# Patient Record
Sex: FEMALE | Race: WHITE | NOT HISPANIC OR LATINO | Employment: FULL TIME | URBAN - METROPOLITAN AREA
[De-identification: names, ages, dates, MRNs, and addresses within clinical notes are randomized per-mention and may not be internally consistent; named-entity substitution may affect disease eponyms.]

---

## 2018-01-03 ENCOUNTER — OFFICE VISIT (OUTPATIENT)
Dept: ORTHOPEDIC SURGERY | Facility: CLINIC | Age: 43
End: 2018-01-03

## 2018-01-03 VITALS
HEIGHT: 62 IN | DIASTOLIC BLOOD PRESSURE: 90 MMHG | SYSTOLIC BLOOD PRESSURE: 150 MMHG | WEIGHT: 182.98 LBS | BODY MASS INDEX: 33.67 KG/M2

## 2018-01-03 DIAGNOSIS — S83.005A PATELLAR DISLOCATION, LEFT, INITIAL ENCOUNTER: ICD-10-CM

## 2018-01-03 DIAGNOSIS — R52 PAIN: Primary | ICD-10-CM

## 2018-01-03 PROCEDURE — 99204 OFFICE O/P NEW MOD 45 MIN: CPT | Performed by: ORTHOPAEDIC SURGERY

## 2018-01-03 NOTE — PROGRESS NOTES
[facility] [facility] Note    Subjective     Chief Complaint   Patient presents with   • Left Knee - Pain        HPI      Farida Flowers is a 42 y.o. female.  She injured her knee at work when she had her foot caught in a metal rack and twisted her knee at [employer].  This happened December 15, 2017.  She takes Tylenol 3.  Pain is 3 out of 10 and aching.  She had an MRI at [facility].        History reviewed. No pertinent past medical history.   Past Surgical History:   Procedure Laterality Date   • RHINOPLASTY      septo plastisis      Family History   Problem Relation Age of Onset   • Cancer Mother    • Osteoarthritis Father      Social History     Social History   • Marital status:      Spouse name: N/A   • Number of children: N/A   • Years of education: N/A     Occupational History   • Not on file.     Social History Main Topics   • Smoking status: Former Smoker     Packs/day: 1.50     Years: 5.00     Types: Cigarettes     Start date: 1992     Quit date: 1997   • Smokeless tobacco: Former User   • Alcohol use Yes      Comment: occasional   • Drug use: No   • Sexual activity: Defer     Other Topics Concern   • Not on file     Social History Narrative   • No narrative on file      No current outpatient prescriptions on file prior to visit.     No current facility-administered medications on file prior to visit.       No Known Allergies     The following portions of the patient's history were reviewed and updated as appropriate: allergies, current medications, past family history, past medical history, past social history, past surgical history and problem list.    Review of Systems   Constitutional: Negative.    HENT: Negative.    Eyes: Negative.    Respiratory: Negative.    Cardiovascular: Negative.    Gastrointestinal: Negative.    Endocrine: Negative.    Genitourinary: Negative.    Musculoskeletal: Positive for arthralgias.   Skin: Negative.    Allergic/Immunologic:  "Negative.    Neurological: Negative.    Hematological: Negative.    Psychiatric/Behavioral: Negative.         Objective      Physical Exam  /90  Ht 157.5 cm (62\")  Wt 83 kg (182 lb 15.7 oz)  BMI 33.47 kg/m2    Body mass index is 33.47 kg/(m^2).        GENERAL APPEARANCE: awake, alert & oriented x 3, in no acute distress and well developed, well nourished  PSYCH: normal mood andaffect  LUNGS:  breathing nonlabored, no wheezing  EYES: sclera anicteric, pupils equal  CARDIOVASCULAR: palpable pulses dorsalis pedis, palpable posterior tibial bilaterally. Capillary refill less than 2 seconds  INTEGUMENTARY: skin intact, no clubbing, cyanosis  NEUROLOGIC:  Antalgic  gait on the left and normal balance            Ortho Exam  Peripheral Vascular:    Upper Extremity:   Inspection:  Left--no cyanotic nail beds Right--no cyanotic nail beds   Bilateral:  Pink nail beds with brisk capillary refill   Palpation:  Bilateral radial pulse normal    Musculoskeletal:  Global Assessment:  Overall assessment of Lower Extremity Muscle Strength and Tone:  Left quadriceps--5/5   Left hamstrings--5/5       Left tibialis anterior--5/5  Left gastroc-soleus--5/5  Left EHL--5/5      Lower Extremity:  Knee/Patella:  No digital clubbing or cyanosis.    Examination of left knee reveals:  Normal deep tendon reflexes, coordination, strength, tone, sensation.  No known fractures or deformities.    Inspection and Palpation:    Left knee:  Tenderness:  Medial patella  Effusion: 1+  Crepitus:  none  Pulses:  2+  Ecchymosis:  None  Warmth:  None       ROM:  Right:  Extension:0    Flexion:135  Left:  Extension:0     Flexion:135    Instability:    Left:  Lachman Test:  Negative, Varus stress test negative, Valgus stress test negative   Anterior Drawer Test:  Negative, Posterior Drawer Test:  Negative  Laxity of medial patellofemoral ligament      Deformities/Malalignments/Discrepancies:    Left:  none  Right:  none    Functional " Testing:    Left:  Cameron's test:  Negative  Patella grind test:  Negative  Q-angle:  Normal  Apprehension Sign:  Positive patellar apprehension    Imaging/Studies  Imaging Results (last 24 hours)     Procedure Component Value Units Date/Time    XR Knee 4+ View Left [[id number]] Resulted:  01/03/18 1025     Updated:  01/03/18 1027    Narrative:       Knee X-Ray  Indication: Pain    Upright AP of bilateral knees. Lateral, skiers and Sunrise views of left   knee     Findings:  No fracture  No bony lesion  Normal soft tissues  Normal joint spaces    No prior studies were available for comparison.            I reviewed the MRI left knee which is partial tear of the ACL lateral patella subluxation and joint effusion  Assessment/Plan      Farida was seen today for pain.    Diagnoses and all orders for this visit:    Pain  -     XR Knee 4+ View Left    Patellar dislocation, left, initial encounter  -     Ambulatory Referral to Physical Therapy      I recommend physical therapy.  She was given a patella stabilizing sleeve today.  She will follow-up in 3 weeks.  She is work restrictions of seated job only or off work.    Medical Decision Making  Management Options : over-the-counter medicine, physical/occupational therapy and close treatment of fracture or dislocation  Data/Risk: radiology tests and independent visualization of imaging, lab tests, or EMG/NCV    Ramon Diez MD  01/03/18  10:36 AM

## 2018-01-24 ENCOUNTER — OFFICE VISIT (OUTPATIENT)
Dept: ORTHOPEDIC SURGERY | Facility: CLINIC | Age: 43
End: 2018-01-24

## 2018-01-24 VITALS
HEART RATE: 68 BPM | WEIGHT: 178.57 LBS | DIASTOLIC BLOOD PRESSURE: 82 MMHG | SYSTOLIC BLOOD PRESSURE: 105 MMHG | HEIGHT: 62 IN | BODY MASS INDEX: 32.86 KG/M2

## 2018-01-24 DIAGNOSIS — S83.005D PATELLAR DISLOCATION, LEFT, SUBSEQUENT ENCOUNTER: Primary | ICD-10-CM

## 2018-01-24 PROCEDURE — 99213 OFFICE O/P EST LOW 20 MIN: CPT | Performed by: ORTHOPAEDIC SURGERY

## 2018-01-24 RX ORDER — BENZONATATE 100 MG/1
100 CAPSULE ORAL DAILY
COMMUNITY

## 2018-01-24 NOTE — PROGRESS NOTES
[facility]    Subjective     Chief Complaint   Patient presents with   • Follow-up     3 weeks - Left knee patellar dislocation        HPI      Farida Flowers is a 42 y.o. female.  She believe she is getting better.  She is not able to go back to work.  They do not have light duty for her.  She goes to physical therapy at [facility]        History reviewed. No pertinent past medical history.   Past Surgical History:   Procedure Laterality Date   • RHINOPLASTY      septo plastisis      Family History   Problem Relation Age of Onset   • Cancer Mother    • Osteoarthritis Father      Social History     Social History   • Marital status:      Spouse name: N/A   • Number of children: N/A   • Years of education: N/A     Occupational History   • Not on file.     Social History Main Topics   • Smoking status: Former Smoker     Packs/day: 1.50     Years: 5.00     Types: Cigarettes     Start date: 1992     Quit date: 1997   • Smokeless tobacco: Former User   • Alcohol use Yes      Comment: occasional   • Drug use: No   • Sexual activity: Defer     Other Topics Concern   • Not on file     Social History Narrative      Current Outpatient Prescriptions on File Prior to Visit   Medication Sig Dispense Refill   • Acetaminophen-Codeine (TYLENOL WITH CODEINE #3 PO) Take  by mouth Every Night.       No current facility-administered medications on file prior to visit.       No Known Allergies     The following portions of the patient's history were reviewed and updated as appropriate: allergies, current medications, past family history, past medical history, past social history, past surgical history and problem list.    Review of Systems   Constitutional: Negative.    HENT: Negative.    Eyes: Negative.    Respiratory: Negative.    Cardiovascular: Negative.    Gastrointestinal: Negative.    Endocrine: Negative.    Genitourinary: Negative.    Musculoskeletal: Positive for arthralgias, gait  "problem and joint swelling.   Skin: Negative.    Allergic/Immunologic: Negative.    Hematological: Negative.    Psychiatric/Behavioral: Negative.         Objective      Physical Exam  /82  Pulse 68  Ht 157.5 cm (62.01\")  Wt 81 kg (178 lb 9.2 oz)  BMI 32.65 kg/m2    Body mass index is 32.65 kg/(m^2).        GENERAL APPEARANCE: awake, alert & oriented x 3, in no acute distress and well developed, well nourished  PSYCH: normal mood andaffect            Ortho Exam  Peripheral Vascular:    Upper Extremity:   Inspection:  Left--no cyanotic nail beds Right--no cyanotic nail beds   Bilateral:  Pink nail beds with brisk capillary refill   Palpation:  Bilateral radial pulse normal    Musculoskeletal:  Global Assessment:  Overall assessment of Lower Extremity Muscle Strength and Tone:  Left quadriceps--5/5   Left hamstrings--5/5       Left tibialis anterior--5/5  Left gastroc-soleus--5/5  Left EHL--5/5      Lower Extremity:  Knee/Patella:  No digital clubbing or cyanosis.    Examination of left knee reveals:  Normal deep tendon reflexes, coordination, strength, tone, sensation.  No known fractures or deformities.    Inspection and Palpation:    Left knee:  Tenderness:  none  Effusion:  none  Crepitus:  none  Pulses:  2+  Ecchymosis:  None  Warmth:  None       ROM:  Right:  Extension:0    Flexion:135  Left:  Extension:0     Flexion:135    Instability:    Left:  Lachman Test:  Negative, Varus stress test negative, Valgus stress test negative   Anterior Drawer Test:  Negative, Posterior Drawer Test:  Negative      Deformities/Malalignments/Discrepancies:    Left:  none  Right:  none    Functional Testing:    Left:  Cameron's test:  Negative  Patella grind test:  Negative  Q-angle:  Normal  Apprehension Sign:  Positive    I reviewed the previous outside MRI with a partial ACL tear and lateral patella subluxation.  Assessment/Plan      Farida was seen today for follow-up.    Diagnoses and all orders for this " visit:    Patellar dislocation, left, subsequent encounter  -     Ambulatory Referral to Physical Therapy    She requires continued physical therapy.  She is still on work restrictions of a seated job only or off work.  She will follow up in 3 weeks.  She is getting better    Medical Decision Making  Management Options : over-the-counter medicine and physical/occupational therapy  The previous images were reviewed again    Ramon Diez MD  01/24/18  9:01 AM

## 2018-02-16 ENCOUNTER — OFFICE VISIT (OUTPATIENT)
Dept: ORTHOPEDIC SURGERY | Facility: CLINIC | Age: 43
End: 2018-02-16

## 2018-02-16 VITALS
WEIGHT: 178.57 LBS | HEART RATE: 67 BPM | BODY MASS INDEX: 32.86 KG/M2 | SYSTOLIC BLOOD PRESSURE: 123 MMHG | HEIGHT: 62 IN | DIASTOLIC BLOOD PRESSURE: 86 MMHG

## 2018-02-16 DIAGNOSIS — S83.005D PATELLAR DISLOCATION, LEFT, SUBSEQUENT ENCOUNTER: Primary | ICD-10-CM

## 2018-02-16 PROCEDURE — 99212 OFFICE O/P EST SF 10 MIN: CPT | Performed by: ORTHOPAEDIC SURGERY

## 2018-02-16 NOTE — PROGRESS NOTES
[facility]    Subjective     Chief Complaint   Patient presents with   • Left Knee - Follow-up     3 weeks - Left knee patellar dislocation        HPI      Farida Flowers is a 42 y.o. female.  She is follow-up left knee injury.    At work December 15, 2017.  Her pain is 0.  She's been brace anti-inflammatories.  Her physical therapist has released her.        History reviewed. No pertinent past medical history.   Past Surgical History:   Procedure Laterality Date   • RHINOPLASTY      septo plastisis      Family History   Problem Relation Age of Onset   • Cancer Mother    • Osteoarthritis Father      Social History     Social History   • Marital status:      Spouse name: N/A   • Number of children: N/A   • Years of education: N/A     Occupational History   • Not on file.     Social History Main Topics   • Smoking status: Former Smoker     Packs/day: 1.50     Years: 5.00     Types: Cigarettes     Start date: 1992     Quit date: 1997   • Smokeless tobacco: Former User   • Alcohol use Yes      Comment: occasional   • Drug use: No   • Sexual activity: Defer     Other Topics Concern   • Not on file     Social History Narrative      Current Outpatient Prescriptions on File Prior to Visit   Medication Sig Dispense Refill   • Acetaminophen-Codeine (TYLENOL WITH CODEINE #3 PO) Take  by mouth Every Night.     • benzonatate (TESSALON) 100 MG capsule Take 100 mg by mouth Daily.       No current facility-administered medications on file prior to visit.       No Known Allergies     The following portions of the patient's history were reviewed and updated as appropriate: allergies, current medications, past family history, past medical history, past social history, past surgical history and problem list.    Review of Systems   Constitutional: Negative.    HENT: Negative.    Eyes: Negative.    Respiratory: Negative.    Cardiovascular: Negative.    Gastrointestinal: Negative.    Endocrine:  "Negative.    Genitourinary: Negative.    Musculoskeletal: Positive for arthralgias and gait problem.   Skin: Negative.    Allergic/Immunologic: Negative.    Hematological: Negative.    Psychiatric/Behavioral: Negative.         Objective      Physical Exam  /86  Pulse 67  Ht 157.5 cm (62.01\")  Wt 81 kg (178 lb 9.2 oz)  BMI 32.65 kg/m2    Body mass index is 32.65 kg/(m^2).        GENERAL APPEARANCE: awake, alert & oriented x 3, in no acute distress and well developed, well nourished  PSYCH: normal mood and affect           Ortho Exam  Peripheral Vascular:    Upper Extremity:   Inspection:  Left--no cyanotic nail beds Right--no cyanotic nail beds   Bilateral:  Pink nail beds with brisk capillary refill   Palpation:  Bilateral radial pulse normal    Musculoskeletal:  Global Assessment:  Overall assessment of Lower Extremity Muscle Strength and Tone:  Left quadriceps--5/5   Left hamstrings--5/5       Left tibialis anterior--5/5  Left gastroc-soleus--5/5  Left EHL--5/5      Lower Extremity:  Knee/Patella:  No digital clubbing or cyanosis.    Examination of left knee reveals:  Normal deep tendon reflexes, coordination, strength, tone, sensation.  No known fractures or deformities.    Inspection and Palpation:    Left knee:  Tenderness:  none  Effusion:  none  Crepitus:  none  Pulses:  2+  Ecchymosis:  None  Warmth:  None       ROM:  Right:  Extension:0    Flexion:135  Left:  Extension:0     Flexion:135    Instability:    Left:  Lachman Test:  Negative, Varus stress test negative, Valgus stress test negative   Anterior Drawer Test:  Negative, Posterior Drawer Test:  Negative      Deformities/Malalignments/Discrepancies:    Left:  none  Right:  none    Functional Testing:    Left:  Cameron's test:  Negative  Patella grind test:  Negative  Q-angle:  Normal  Apprehension Sign:  Negative    Imaging/Studies  Imaging Results (last 24 hours)     ** No results found for the last 24 hours. **        Previous MRI with " partial ACL tear and patella subluxation  Assessment/Plan      Farida was seen today for follow-up.    Diagnoses and all orders for this visit:    Patellar dislocation, left, subsequent encounter      She may return to work full duty and  she will follow-up as needed.  She is at [facility]    Medical Decision Making  Management Options : over-the-counter medicine      Ramon Diez MD  02/16/18  10:01 AM

## 2018-08-09 ENCOUNTER — TRANSCRIBE ORDERS (OUTPATIENT)
Dept: ADMINISTRATIVE | Facility: HOSPITAL | Age: 43
End: 2018-08-09

## 2018-08-10 ENCOUNTER — TRANSCRIBE ORDERS (OUTPATIENT)
Dept: ADMINISTRATIVE | Facility: HOSPITAL | Age: 43
End: 2018-08-10

## 2018-08-10 DIAGNOSIS — Z12.39 SCREENING BREAST EXAMINATION: Primary | ICD-10-CM

## 2019-01-02 ENCOUNTER — TRANSCRIBE ORDERS (OUTPATIENT)
Dept: ADMINISTRATIVE | Facility: HOSPITAL | Age: 44
End: 2019-01-02

## 2019-01-02 DIAGNOSIS — Z12.31 VISIT FOR SCREENING MAMMOGRAM: Primary | ICD-10-CM

## 2019-02-15 ENCOUNTER — HOSPITAL ENCOUNTER (OUTPATIENT)
Dept: MAMMOGRAPHY | Facility: HOSPITAL | Age: 44
Discharge: HOME | End: 2019-02-15
Admitting: INTERNAL MEDICINE

## 2019-02-15 ENCOUNTER — APPOINTMENT (OUTPATIENT)
Dept: OTHER | Facility: HOSPITAL | Age: 44
End: 2019-02-15

## 2019-02-15 DIAGNOSIS — Z12.31 VISIT FOR SCREENING MAMMOGRAM: ICD-10-CM

## 2019-02-15 PROCEDURE — 77063 BREAST TOMOSYNTHESIS BI: CPT | Performed by: RADIOLOGY

## 2019-02-15 PROCEDURE — 77067 SCR MAMMO BI INCL CAD: CPT | Performed by: RADIOLOGY

## 2019-02-15 PROCEDURE — 77063 BREAST TOMOSYNTHESIS BI: CPT

## 2019-02-15 PROCEDURE — 77067 SCR MAMMO BI INCL CAD: CPT

## 2019-09-08 ENCOUNTER — APPOINTMENT (OUTPATIENT)
Dept: GENERAL RADIOLOGY | Facility: HOSPITAL | Age: 44
End: 2019-09-08

## 2019-09-08 ENCOUNTER — HOSPITAL ENCOUNTER (EMERGENCY)
Facility: HOSPITAL | Age: 44
Discharge: HOME | End: 2019-09-08
Attending: STUDENT IN AN ORGANIZED HEALTH CARE EDUCATION/TRAINING PROGRAM | Admitting: STUDENT IN AN ORGANIZED HEALTH CARE EDUCATION/TRAINING PROGRAM

## 2019-09-08 VITALS
SYSTOLIC BLOOD PRESSURE: 117 MMHG | HEART RATE: 61 BPM | OXYGEN SATURATION: 96 % | RESPIRATION RATE: 16 BRPM | TEMPERATURE: 98.1 F | DIASTOLIC BLOOD PRESSURE: 72 MMHG | WEIGHT: 190.4 LBS | BODY MASS INDEX: 35.04 KG/M2 | HEIGHT: 62 IN

## 2019-09-08 DIAGNOSIS — S63.501A WRIST SPRAIN, RIGHT, INITIAL ENCOUNTER: Primary | ICD-10-CM

## 2019-09-08 DIAGNOSIS — S83.91XA SPRAIN OF RIGHT KNEE, UNSPECIFIED LIGAMENT, INITIAL ENCOUNTER: ICD-10-CM

## 2019-09-08 PROCEDURE — 73562 X-RAY EXAM OF KNEE 3: CPT

## 2019-09-08 PROCEDURE — 73110 X-RAY EXAM OF WRIST: CPT

## 2019-09-08 PROCEDURE — 99283 EMERGENCY DEPT VISIT LOW MDM: CPT

## 2019-09-08 RX ORDER — MELOXICAM 7.5 MG/1
7.5 TABLET ORAL DAILY
Status: DISCONTINUED | OUTPATIENT
Start: 2019-09-08 | End: 2019-09-08 | Stop reason: HOSPADM

## 2019-09-08 RX ORDER — MELOXICAM 7.5 MG/1
7.5 TABLET ORAL DAILY
Qty: 14 TABLET | Refills: 0 | Status: SHIPPED | OUTPATIENT
Start: 2019-09-08

## 2019-09-08 RX ADMIN — MELOXICAM 7.5 MG: 7.5 TABLET ORAL at 14:54

## 2019-09-08 NOTE — ED PROVIDER NOTES
Subjective   History of Present Illness  This is a 43 year old female who comes in today complaining of falling at work last night. She reports pain to her right knee and her right wrist. She denies any other injuries.   Review of Systems   Constitutional: Negative.    HENT: Negative.    Eyes: Negative.    Respiratory: Negative.    Cardiovascular: Negative.    Gastrointestinal: Negative.    Endocrine: Negative.    Genitourinary: Negative.    Musculoskeletal:        Right wrist and right knee pain.   Skin: Negative.    Allergic/Immunologic: Negative.    Neurological: Negative.    Hematological: Negative.    Psychiatric/Behavioral: Negative.    All other systems reviewed and are negative.      History reviewed. No pertinent past medical history.    No Known Allergies    Past Surgical History:   Procedure Laterality Date   • RHINOPLASTY      septo plastisis       Family History   Problem Relation Age of Onset   • Cancer Mother    • Osteoarthritis Father    • Ovarian cancer Neg Hx    • Breast cancer Neg Hx        Social History     Socioeconomic History   • Marital status:      Spouse name: Not on file   • Number of children: Not on file   • Years of education: Not on file   • Highest education level: Not on file   Tobacco Use   • Smoking status: Former Smoker     Packs/day: 1.50     Years: 5.00     Pack years: 7.50     Types: Cigarettes     Start date:      Last attempt to quit:      Years since quittin.6   • Smokeless tobacco: Former User   Substance and Sexual Activity   • Alcohol use: Yes     Comment: occasional   • Drug use: No   • Sexual activity: Defer           Objective   Physical Exam   Constitutional: She appears well-developed and well-nourished.   Nursing note and vitals reviewed.  GEN: No acute distress  Head: Normocephalic, atraumatic  Eyes: Pupils equal round reactive to light  ENT: Posterior pharynx normal in appearance, oral mucosa is moist  Chest: Nontender to  palpation  Cardiovascular: Regular rate  Lungs: Clear to auscultation bilaterally  Abdomen: Soft, nontender, nondistended, no peritoneal signs  Extremities: No edema, normal appearance, tender right knee, tender right wrist. Limited ROM due to pain.   Neuro: GCS 15  Psych: Mood and affect are appropriate      Procedures           ED Course  ED Course as of Sep 08 1501   Sun Sep 08, 2019   1500 I have advised her to follow up with occupational med tomorrow for evaluation. I Have given her return to care instructions and she is agreeable to this plan of care.   [TW]      ED Course User Index  [TW] Ladonna Rosario, APRVIRI                  MDM  Number of Diagnoses or Management Options     Amount and/or Complexity of Data Reviewed  Tests in the radiology section of CPT®: ordered and reviewed  Review and summarize past medical records: yes  Discuss the patient with other providers: yes  Independent visualization of images, tracings, or specimens: yes    Risk of Complications, Morbidity, and/or Mortality  Presenting problems: low  Diagnostic procedures: low  Management options: low        Final diagnoses:   Wrist sprain, right, initial encounter   Sprain of right knee, unspecified ligament, initial encounter              Ladonna Rosario APRN  09/08/19 1501

## 2019-09-10 ENCOUNTER — TRANSCRIBE ORDERS (OUTPATIENT)
Dept: PHYSICAL THERAPY | Facility: CLINIC | Age: 44
End: 2019-09-10

## 2019-09-10 DIAGNOSIS — S80.01XA CONTUSION OF RIGHT KNEE, INITIAL ENCOUNTER: ICD-10-CM

## 2019-09-10 DIAGNOSIS — S83.91XA SPRAIN OF RIGHT KNEE, UNSPECIFIED LIGAMENT, INITIAL ENCOUNTER: Primary | ICD-10-CM

## 2019-09-13 ENCOUNTER — OFFICE VISIT (OUTPATIENT)
Dept: PHYSICAL THERAPY | Facility: CLINIC | Age: 44
End: 2019-09-13

## 2019-09-13 DIAGNOSIS — M25.561 ACUTE PAIN OF RIGHT KNEE: Primary | ICD-10-CM

## 2019-09-13 DIAGNOSIS — S80.01XD CONTUSION OF RIGHT KNEE, SUBSEQUENT ENCOUNTER: ICD-10-CM

## 2019-09-13 PROCEDURE — 97140 MANUAL THERAPY 1/> REGIONS: CPT | Performed by: PHYSICAL THERAPIST

## 2019-09-13 PROCEDURE — 97530 THERAPEUTIC ACTIVITIES: CPT | Performed by: PHYSICAL THERAPIST

## 2019-09-13 PROCEDURE — 97161 PT EVAL LOW COMPLEX 20 MIN: CPT | Performed by: PHYSICAL THERAPIST

## 2019-09-13 NOTE — PROGRESS NOTES
Physical Therapy Initial Evaluation and Plan of Care      Patient: Farida Flowers   : 1975  Diagnosis/ICD-10 Code:  No primary diagnosis found.  Referring practitioner: Dalia Flores, *    Subjective Evaluation    History of Present Illness  Date of onset: 2019  Mechanism of injury: Pt reports that she fell through a pallet at work and has hurt her R knee and wrist. Pt reports everything happened too fast to knee what happened. Pt reports having a lot of trouble with walking the next day although the knee has gotten better. Pt reports that prolonged standing, walking, and squatting makes pain worse. Pt reports no popping or clicking. Pt reports elevating, ibuprofen, ice and heat help relieve pain. Pt reports being on sit down duty at work.       Patient Occupation: [employer]  Current pain ratin  At best pain ratin  At worst pain rating: 10  Location: R knee  Quality: throbbing  Relieving factors: ice, heat, medications and rest  Aggravating factors: squatting, ambulation, stairs and standing  Progression: improved    Social Support  Lives in: one-story house  Lives with: spouse    Hand dominance: right    Diagnostic Tests  X-ray: normal    Treatments  Previous treatment: physical therapy and medication  Current treatment: medication  Patient Goals  Patient goals for therapy: decreased pain, increased motion and increased strength  Patient goal: Return to full work duties           Objective       Palpation   Left   No palpable tenderness to the distal biceps femoris, distal semimembranosus, distal semitendinosus, lateral gastrocnemius and medial gastrocnemius.   Tenderness of the rectus femoris, vastus lateralis and vastus medialis.     Right   No palpable tenderness to the distal biceps femoris, distal semimembranosus, distal semitendinosus, lateral gastrocnemius and medial gastrocnemius. Tenderness of the rectus femoris, vastus lateralis and vastus medialis.      Tenderness   Left Knee   Tenderness in the ITB, lateral joint line and medial joint line. No tenderness in the pes anserinus.     Right Knee   Tenderness in the ITB, lateral joint line, medial joint line, pes anserinus and quadriceps tendon. No tenderness in the patellar tendon and tibial tubercle.     Neurological Testing     Sensation     Lumbar   Left   Intact: light touch    Right   Intact: light touch  Paresthesia: light touch    Reflexes   Left   Patellar (L4): normal (2+)  Achilles (S1): absent (0)    Right   Patellar (L4): normal (2+)  Achilles (S1): absent (0)    Additional Neurological Details  Pt unable to relax to get S1 reflexes.     Active Range of Motion   Left Knee   Flexion: WFL  Extension: WFL    Right Knee   Flexion: 142 degrees   Extension: 0 degrees     Strength/Myotome Testing     Left Hip   Planes of Motion   Flexion: 4+  Abduction: 4-    Right Hip   Planes of Motion   Abduction: 4-    Left Knee   Flexion: 4  Extension: 5    Right Knee   Flexion: 4+  Right knee extension strength: painful.    Left Ankle/Foot   Dorsiflexion: 4+    Right Ankle/Foot   Dorsiflexion: 4+    Tests     Left Knee   Negative anterior drawer, posterior drawer, Thessaly's test at 5 degrees, Thessaly's test at 20 degrees, valgus stress test at 0 degrees and varus stress test at 0 degrees.     Right Knee   Negative anterior drawer, lateral Cameron, medial Cameron, posterior drawer, Thessaly's test at 5 degrees, Thessaly's test at 20 degrees, valgus stress test at 0 degrees and varus stress test at 0 degrees.     Ambulation     Observational Gait   Gait: antalgic   Stride length within functional limits. Decreased walking speed.          Assessment & Plan     Assessment  Impairments: abnormal gait, abnormal or restricted ROM, activity intolerance, impaired physical strength, lacks appropriate home exercise program and pain with function  Assessment details: Pt is a 43 year old female that presents to PT with knee pain  following fall at work. Pt with no neuro signs noted with exam. Pt with no ligamentous laxity or meniscal lesions noted with testing. Pt with weakness due to pain with knee ext on R knee and hip weakness bilaterally. Pt with hematoma over R pes anserinus and diffuse tenderness into both lateral and medial joint lines and into the R quad. Pt would benefit from PT to address the above issues.   Prognosis: good  Prognosis details: Short Term Goals (2 weeks)  1. Pt will demonstrate independence with HEP  2. Pt will increase hip abduction strength to 4/5 to help improve gait stability over flat surfaces.  3. Pt will have 50% decreased tenderness over the hematoma on R knee.    Long Term Goals (6 weeks)  1. Pt will increase hip abduction strength to 4+/5 to help improve stability of gait over uneven surfaces  2. Pt will be able to perform full functional squat without pain in the R knee.  3. Pt will be able to ascend and descend 12 steps without pain in the R knee.  Functional Limitations: walking, uncomfortable because of pain and stooping  Plan  Therapy options: will be seen for skilled physical therapy services  Planned modality interventions: cryotherapy and thermotherapy (hydrocollator packs)  Planned therapy interventions: abdominal trunk stabilization, body mechanics training, balance/weight-bearing training, fine motor coordination training, flexibility, functional ROM exercises, gait training, home exercise program, joint mobilization, manual therapy, motor coordination training, neuromuscular re-education, postural training, spinal/joint mobilization, soft tissue mobilization, strengthening, stretching and therapeutic activities  Frequency: 2x week  Treatment plan discussed with: patient  Plan details: Pt to be seen 2x per week for 6-8 weeks        Manual Therapy:     10    mins  71778;  Therapeutic Exercise:         mins  05964;     Neuromuscular Dmitriy:        mins  98424;    Therapeutic Activity:    12       mins  07822;     Gait Training:           mins  [id number];     Ultrasound:          mins  65321;    Electrical Stimulation:         mins  10527 ( );  Dry Needling          mins self-pay    Timed Treatment:  22    mins   Total Treatment:    46    mins    PT SIGNATURE: Enrique Ontiveros, PT   DATE TREATMENT INITIATED: 9/13/2019    Initial Certification  Certification Period: 12/12/2019  I certify that the therapy services are furnished while this patient is under my care.  The services outlined above are required by this patient, and will be reviewed every 90 days.     PHYSICIAN: Dalia Flores, APRN      DATE:     Please sign and return via fax to [phone].. Thank you, [facility].

## 2019-09-18 ENCOUNTER — TREATMENT (OUTPATIENT)
Dept: PHYSICAL THERAPY | Facility: CLINIC | Age: 44
End: 2019-09-18

## 2019-09-18 DIAGNOSIS — M25.561 ACUTE PAIN OF RIGHT KNEE: Primary | ICD-10-CM

## 2019-09-18 DIAGNOSIS — S80.01XD CONTUSION OF RIGHT KNEE, SUBSEQUENT ENCOUNTER: ICD-10-CM

## 2019-09-18 PROCEDURE — 97140 MANUAL THERAPY 1/> REGIONS: CPT | Performed by: PHYSICAL THERAPIST

## 2019-09-18 PROCEDURE — 97110 THERAPEUTIC EXERCISES: CPT | Performed by: PHYSICAL THERAPIST

## 2019-09-18 NOTE — PROGRESS NOTES
Physical Therapy Daily Progress Note        Farida Flowers reports 0/10 pain today at rest.  Pt reports that she feels much better today with no pain in the knee and greatly reduced tenderness over contusion. Pt reports that she has had no problems with ambulation or stairs and is doing well on light duty at work.         Objective Pt present to PT today with no distress at rest.     Pt tolerated increased WB and functional activities well with no pain in the R knee.       See Exercise, Manual, and Modality Logs for complete treatment.     Assessment/Plan  Pt to follow up next week and address wrist pain with worker's comp before next appointment. Pt knee pain mostly resolved with minimal tenderness over contusion. Pt to follow up to assess reaction of knee to increased activities and possibly evaluate wrist.        Progress per Plan of Care  Assess knee pain           Manual Therapy:    10     mins  29567;  Therapeutic Exercise:    14     mins  22774;     Neuromuscular Dmitriy:        mins  55781;    Therapeutic Activity:          mins  15562;     Gait Training:        ___  mins  03121;     Ultrasound:          mins  19760;    Electrical Stimulation:         mins  28514 ( );  Dry Needling          mins self-pay    Timed Treatment:   24   mins   Total Treatment:     44   mins    Enrique Ontiveros, PT  Physical Therapist

## 2019-09-30 ENCOUNTER — OFFICE VISIT (OUTPATIENT)
Dept: ORTHOPEDIC SURGERY | Facility: CLINIC | Age: 44
End: 2019-09-30

## 2019-09-30 VITALS — RESPIRATION RATE: 18 BRPM | BODY MASS INDEX: 34.96 KG/M2 | WEIGHT: 190 LBS | HEIGHT: 62 IN

## 2019-09-30 DIAGNOSIS — S63.501A WRIST SPRAIN, RIGHT, INITIAL ENCOUNTER: Primary | ICD-10-CM

## 2019-09-30 DIAGNOSIS — M18.11 PRIMARY OSTEOARTHRITIS OF FIRST CARPOMETACARPAL JOINT OF RIGHT HAND: ICD-10-CM

## 2019-09-30 DIAGNOSIS — G56.01 CARPAL TUNNEL SYNDROME OF RIGHT WRIST: ICD-10-CM

## 2019-09-30 PROCEDURE — 99203 OFFICE O/P NEW LOW 30 MIN: CPT | Performed by: ORTHOPAEDIC SURGERY

## 2019-09-30 RX ORDER — ETONOGESTREL/ETHINYL ESTRADIOL .12-.015MG
RING, VAGINAL VAGINAL
COMMUNITY
Start: 2019-08-29

## 2019-09-30 NOTE — PROGRESS NOTES
Subjective   Patient ID: Farida Flowers is a 44 y.o. female  Pain of the Right Wrist (Patient is here today for a work related injury that happened on 09/07/19 while working at the [employer]. She states when she fell she landed weird when she fell, patient states her foot got caught in a pallet.)             History of Present Illness  Right-hand-dominant 44-year-old employee at [employer] was just finished loading a heavy box up onto a shelf where she turned and her foot fell through a crate and she landed strangely on her right knee and also at the time hurt her wrist.  Her knee hurts so much she did not notice the wrist hurting until the next day she woke up had loss of feeling in the thumb with pain in the thumb x-rays of the wrist were negative for acute fracture.  She was given a splint put on light duty continues to complain of loss of feeling in the thumb pain in the thumb and pain with wrist range of motion.  At times the pain is on the ulnar side of the wrist sometimes radiating up the ulnar side of the forearm towards the elbow.  Denies neck injury neck pain neck stiffness or history of prior numbness or tingling in the right hand.  The nature of her work involves frequent lifting and moving objects both small and large      Review of Systems   Constitutional: Negative for fever.   HENT: Negative for voice change.    Eyes: Negative for visual disturbance.   Respiratory: Negative for shortness of breath.    Cardiovascular: Negative for chest pain.   Gastrointestinal: Negative for abdominal pain.   Genitourinary: Negative for dysuria.   Musculoskeletal: Positive for arthralgias. Negative for gait problem and joint swelling.   Skin: Negative for rash.   Neurological: Negative for speech difficulty.   Hematological: Does not bruise/bleed easily.   Psychiatric/Behavioral: Negative for confusion.       Past Medical History:   Diagnosis Date   • Anemia    • Arthritis         Past Surgical History:  "  Procedure Laterality Date   • RHINOPLASTY      septo plastisis       Family History   Problem Relation Age of Onset   • Cancer Mother    • Osteoarthritis Father    • Ovarian cancer Neg Hx    • Breast cancer Neg Hx        Social History     Socioeconomic History   • Marital status:      Spouse name: Not on file   • Number of children: Not on file   • Years of education: Not on file   • Highest education level: Not on file   Tobacco Use   • Smoking status: Former Smoker     Packs/day: 1.50     Years: 5.00     Pack years: 7.50     Types: Cigarettes     Start date:      Last attempt to quit:      Years since quittin.7   • Smokeless tobacco: Former User   Substance and Sexual Activity   • Alcohol use: Yes     Comment: occasional   • Drug use: No   • Sexual activity: Defer       I have reviewed the above medical and surgical history, family history, social history, medications, allergies and review of systems.    No Known Allergies      Current Outpatient Medications:   •  Acetaminophen-Codeine (TYLENOL WITH CODEINE #3 PO), Take  by mouth Every Night., Disp: , Rfl:   •  benzonatate (TESSALON) 100 MG capsule, Take 100 mg by mouth Daily., Disp: , Rfl:   •  meloxicam (MOBIC) 7.5 MG tablet, Take 1 tablet by mouth Daily., Disp: 14 tablet, Rfl: 0  •  NUVARING 0.12-0.015 MG/24HR vaginal ring, , Disp: , Rfl:     Objective   Resp 18   Ht 157.5 cm (62\")   Wt 86.2 kg (190 lb)   LMP 2019   BMI 34.75 kg/m²    Physical Exam  Constitutional: Patient is oriented to person, place, and time. Patient appears well-developed and well-nourished.   HENT:Head: Normocephalic and atraumatic.   Eyes: EOM are normal. Pupils are equal, round, and reactive to light.   Neck: Normal range of motion. Neck supple.   Cardiovascular: Normal rate.    Pulmonary/Chest: Effort normal and breath sounds normal.   Abdominal: Soft.   Neurological: Patient is alert and oriented to person, place, and time.   Skin: Skin is warm and dry. "   Psychiatric: Patient has a normal mood and affect.   Nursing note and vitals reviewed.       [unfilled]   Right wrist: Point tenderness over the TFC area on the ulnar side of the wrist, positive pain with ulnar deviation dorsiflexion over the proximal carpal and distal radiocarpal region, no ecchymosis minimal swelling no true anatomic snuffbox tenderness some pain at the basilar joint near the trapeziometacarpal articulation but no ecchymosis.  Decreased sensation dorsally and volarly at the thumb positive Tinel's finding over the median nerve at the wrist negative Tinel's finding at the ulnar nerve at the elbow and wrist area.   strength slightly decreased because of pain in the wrist no sign of ulnar collateral instability with thumb MP stress.  No triggering on the volar side of the thumb and no pain along the flexor tendon sheath.    Assessment/Plan   Review of Radiographic Studies:    Radiographic images today of affected area I personally viewed and showed no sign of acute fracture or dislocation.      Procedures     Farida was seen today for pain.    Diagnoses and all orders for this visit:    Wrist sprain, right, initial encounter  -     XR Finger 2+ View Right  -     MRI Wrist Right Without Contrast    Carpal tunnel syndrome of right wrist  -     EMG & Nerve Conduction Test    Primary osteoarthritis of first carpometacarpal joint of right hand       Use brace as instructed and Work status form completed and provided to patient      Recommendations/Plan:   Work/Activity Status: Sedentary duty    Patient agreeable to call or return sooner for any concerns.             Impression:  Right wrist sprain rule out TFC tear, probable carpal tunnel syndrome and/or median neuropathy right wrist, minimal trapeziometacarpal osteoarthritis without sign of acute fracture  Plan:  EMG study right hand, MR right wrist, continue light duty restrictions, continue Mobic and use of splint-

## 2019-10-07 ENCOUNTER — TELEPHONE (OUTPATIENT)
Dept: ORTHOPEDIC SURGERY | Facility: CLINIC | Age: 44
End: 2019-10-07

## 2019-10-07 NOTE — TELEPHONE ENCOUNTER
Patient left VM statin she has not heard from us about her MRI.     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 I am in the process of completing the application and will notify the patient when we have received authorization.

## 2019-10-28 ENCOUNTER — HOSPITAL ENCOUNTER (OUTPATIENT)
Dept: MRI IMAGING | Facility: HOSPITAL | Age: 44
Discharge: HOME | End: 2019-10-28
Admitting: ORTHOPAEDIC SURGERY

## 2019-10-28 PROCEDURE — 73221 MRI JOINT UPR EXTREM W/O DYE: CPT

## 2019-10-31 ENCOUNTER — OFFICE VISIT (OUTPATIENT)
Dept: ORTHOPEDIC SURGERY | Facility: CLINIC | Age: 44
End: 2019-10-31

## 2019-10-31 VITALS — RESPIRATION RATE: 18 BRPM | BODY MASS INDEX: 34.96 KG/M2 | HEIGHT: 62 IN | WEIGHT: 190 LBS

## 2019-10-31 DIAGNOSIS — G56.01 CARPAL TUNNEL SYNDROME OF RIGHT WRIST: Primary | ICD-10-CM

## 2019-10-31 PROCEDURE — 99213 OFFICE O/P EST LOW 20 MIN: CPT | Performed by: ORTHOPAEDIC SURGERY

## 2019-10-31 NOTE — PROGRESS NOTES
Subjective   Patient ID: Farida Flowers is a 44 y.o. female  Follow-up and Pain of the Right Wrist (Patient is here today for MRI results.)             History of Present Illness    She returns status post MRI of the right wrist which showed no significant TFC tear or ligament injury arm fracture, she complains of continued popping in the hand less pain still has altered feeling in the fingertips of the index and thumb areas with decreased sensation not improved with splinting.  Has been working sedentary duty no new injuries since the original work comp injury    Review of Systems   Constitutional: Negative for fever.   HENT: Negative for dental problem and voice change.    Eyes: Negative for visual disturbance.   Respiratory: Negative for shortness of breath.    Cardiovascular: Negative for chest pain.   Gastrointestinal: Negative for abdominal pain.   Genitourinary: Negative for dysuria.   Musculoskeletal: Positive for arthralgias. Negative for gait problem and joint swelling.   Skin: Negative for rash.   Neurological: Negative for speech difficulty.   Hematological: Does not bruise/bleed easily.   Psychiatric/Behavioral: Negative for confusion.       Past Medical History:   Diagnosis Date   • Anemia    • Arthritis         Past Surgical History:   Procedure Laterality Date   • RHINOPLASTY      septo plastisis       Family History   Problem Relation Age of Onset   • Cancer Mother    • Osteoarthritis Father    • Ovarian cancer Neg Hx    • Breast cancer Neg Hx        Social History     Socioeconomic History   • Marital status:      Spouse name: Not on file   • Number of children: Not on file   • Years of education: Not on file   • Highest education level: Not on file   Tobacco Use   • Smoking status: Former Smoker     Packs/day: 1.50     Years: 5.00     Pack years: 7.50     Types: Cigarettes     Start date:      Last attempt to quit:      Years since quittin.8   • Smokeless tobacco: Former  "User   Substance and Sexual Activity   • Alcohol use: Yes     Comment: occasional   • Drug use: No   • Sexual activity: Defer       I have reviewed the above medical and surgical history, family history, social history, medications, allergies and review of systems.    No Known Allergies      Current Outpatient Medications:   •  Acetaminophen-Codeine (TYLENOL WITH CODEINE #3 PO), Take  by mouth Every Night., Disp: , Rfl:   •  benzonatate (TESSALON) 100 MG capsule, Take 100 mg by mouth Daily., Disp: , Rfl:   •  meloxicam (MOBIC) 7.5 MG tablet, Take 1 tablet by mouth Daily., Disp: 14 tablet, Rfl: 0  •  NUVARING 0.12-0.015 MG/24HR vaginal ring, , Disp: , Rfl:     Objective   Resp 18   Ht 157.5 cm (62\")   Wt 86.2 kg (190 lb)   BMI 34.75 kg/m²    Physical Exam  Constitutional: Patient is oriented to person, place, and time. Patient appears well-developed and well-nourished.   HENT:Head: Normocephalic and atraumatic.   Eyes: EOM are normal. Pupils are equal, round, and reactive to light.   Neck: Normal range of motion. Neck supple.   Cardiovascular: Normal rate.    Pulmonary/Chest: Effort normal and breath sounds normal.   Abdominal: Soft.   Neurological: Patient is alert and oriented to person, place, and time.   Skin: Skin is warm and dry.   Psychiatric: Patient has a normal mood and affect.   Nursing note and vitals reviewed.       [unfilled]   Right hand: No tenderness over the TFC full ulnar deviation full dorsiflexion no sign of ECU or FCU you tendon subluxation minimal pain at the DRUJ.  No anatomic snuffbox tenderness.  Decreased sensation along the radial border of the index fingertip with positive carpal compression test negative Tinel's finding negative Phalen sign.  Full wrist range of motion    Assessment/Plan   Review of Radiographic Studies:    No new imaging done today.  MRI is unremarkable.  MR study right wrist was directly examined and reviewed with the patient      Procedures     Farida was seen " today for follow-up and pain.    Diagnoses and all orders for this visit:    Carpal tunnel syndrome of right wrist  -     EMG & Nerve Conduction Test       Orthopedic activities reviewed and patient expressed appreciation, Use brace as instructed and Work status form completed and provided to patient      Recommendations/Plan:   Work/Activity Status: Sedentary duty    Patient agreeable to call or return sooner for any concerns.             Impression:  Persistent paresthesias right hand possible carpal tunnel syndrome after work-related injury  Plan:  EMG study right hand continue night splinting continue sedentary work

## 2019-11-21 ENCOUNTER — OFFICE VISIT (OUTPATIENT)
Dept: ORTHOPEDIC SURGERY | Facility: CLINIC | Age: 44
End: 2019-11-21

## 2019-11-21 VITALS — RESPIRATION RATE: 18 BRPM | HEIGHT: 62 IN | BODY MASS INDEX: 34.96 KG/M2 | WEIGHT: 190 LBS

## 2019-11-21 DIAGNOSIS — G56.01 CARPAL TUNNEL SYNDROME OF RIGHT WRIST: Primary | ICD-10-CM

## 2019-11-21 PROCEDURE — 99213 OFFICE O/P EST LOW 20 MIN: CPT | Performed by: ORTHOPAEDIC SURGERY

## 2019-11-21 NOTE — PROGRESS NOTES
Subjective   Patient ID: Farida Flowers is a 44 y.o. female  Follow-up of the Right Wrist (Patient is here today and states improvement, she also says she would rather have PT than an EMG since she is doing better.)             History of Present Illness  Overall pain level has improved however still present on the radial side of the thumb, no pain with wrist range of motion, feels light duty activities have improved her symptoms as well as night splinting.  She prefers not to go through the EMG study at this time would rather try therapy and continue with splinting and modification of work duties.      Review of Systems   Constitutional: Negative for fever.   HENT: Negative for dental problem and voice change.    Eyes: Negative for visual disturbance.   Respiratory: Negative for shortness of breath.    Cardiovascular: Negative for chest pain.   Gastrointestinal: Negative for abdominal pain.   Genitourinary: Negative for dysuria.   Musculoskeletal: Positive for arthralgias. Negative for gait problem and joint swelling.   Skin: Negative for rash.   Neurological: Negative for speech difficulty.   Hematological: Does not bruise/bleed easily.   Psychiatric/Behavioral: Negative for confusion.       Past Medical History:   Diagnosis Date   • Anemia    • Arthritis         Past Surgical History:   Procedure Laterality Date   • RHINOPLASTY      septo plastisis       Family History   Problem Relation Age of Onset   • Cancer Mother    • Osteoarthritis Father    • Ovarian cancer Neg Hx    • Breast cancer Neg Hx        Social History     Socioeconomic History   • Marital status:      Spouse name: Not on file   • Number of children: Not on file   • Years of education: Not on file   • Highest education level: Not on file   Tobacco Use   • Smoking status: Former Smoker     Packs/day: 1.50     Years: 5.00     Pack years: 7.50     Types: Cigarettes     Start date: 1992     Last attempt to quit: 1997     Years since  "quittin.9   • Smokeless tobacco: Former User   Substance and Sexual Activity   • Alcohol use: Yes     Comment: occasional   • Drug use: No   • Sexual activity: Defer       I have reviewed the above medical and surgical history, family history, social history, medications, allergies and review of systems.    No Known Allergies      Current Outpatient Medications:   •  Acetaminophen-Codeine (TYLENOL WITH CODEINE #3 PO), Take  by mouth Every Night., Disp: , Rfl:   •  benzonatate (TESSALON) 100 MG capsule, Take 100 mg by mouth Daily., Disp: , Rfl:   •  meloxicam (MOBIC) 7.5 MG tablet, Take 1 tablet by mouth Daily., Disp: 14 tablet, Rfl: 0  •  NUVARING 0.12-0.015 MG/24HR vaginal ring, , Disp: , Rfl:     Objective   Resp 18   Ht 157.5 cm (62\")   Wt 86.2 kg (190 lb)   BMI 34.75 kg/m²    Physical Exam  Constitutional: Patient is oriented to person, place, and time. Patient appears well-developed and well-nourished.   HENT:Head: Normocephalic and atraumatic.   Eyes: EOM are normal. Pupils are equal, round, and reactive to light.   Neck: Normal range of motion. Neck supple.   Cardiovascular: Normal rate.    Pulmonary/Chest: Effort normal and breath sounds normal.   Abdominal: Soft.   Neurological: Patient is alert and oriented to person, place, and time.   Skin: Skin is warm and dry.   Psychiatric: Patient has a normal mood and affect.   Nursing note and vitals reviewed.       [unfilled]   Right wrist: Full painless wrist range of motion, negative Tinel's and Phalen sign over the median nerve mildly positive carpal compression sign with reproduction of paresthesias in the thumb, no pain with basal joint range of motion no instability with basal joint stress, no triggering of the fingers sensation slightly decreased of the radial side of the thumb only.    Assessment/Plan   Review of Radiographic Studies:    No new imaging done today.      Procedures     Farida was seen today for follow-up.    Diagnoses and all orders " for this visit:    Carpal tunnel syndrome of right wrist  -     Ambulatory Referral to Physical Therapy Evaluate and treat       Physical therapy referral given, Use brace as instructed and Work status form completed and provided to patient      Recommendations/Plan:   Work/Activity Status: Continue light duty restrictions until next visit in 6 weeks    Patient agreeable to call or return sooner for any concerns.             Impression:  Right thumb paresthesias probable carpal tunnel syndrome  Plan:  Continue light duty restrictions night splinting recheck 6 weeks if not improved order EMG study at that time--if symptoms resolve consider released to full work duty

## 2020-01-02 ENCOUNTER — OFFICE VISIT (OUTPATIENT)
Dept: ORTHOPEDIC SURGERY | Facility: CLINIC | Age: 45
End: 2020-01-02

## 2020-01-02 VITALS — WEIGHT: 190 LBS | HEIGHT: 62 IN | BODY MASS INDEX: 34.96 KG/M2 | RESPIRATION RATE: 18 BRPM

## 2020-01-02 DIAGNOSIS — G56.01 CARPAL TUNNEL SYNDROME OF RIGHT WRIST: Primary | ICD-10-CM

## 2020-01-02 PROCEDURE — 99213 OFFICE O/P EST LOW 20 MIN: CPT | Performed by: ORTHOPAEDIC SURGERY

## 2020-01-02 NOTE — PROGRESS NOTES
Subjective   Patient ID: Farida Flowers is a 44 y.o. female  Follow-up of the Right Wrist (Patient is here today for a follow up on her right wrist, she states she completed therapy and is doing great.)             History of Present Illness  She returns with intermittent symptoms of paresthesias in the right hand mostly at night no daytime pain limited motion or numbness or tingling.  Has not noticed any weakness, overall feels symptoms are much improved and she is wearing her night splint.      Review of Systems   Constitutional: Negative for fever.   HENT: Negative for dental problem and voice change.    Eyes: Negative for visual disturbance.   Respiratory: Negative for shortness of breath.    Cardiovascular: Negative for chest pain.   Gastrointestinal: Negative for abdominal pain.   Genitourinary: Negative for dysuria.   Musculoskeletal: Negative for arthralgias, gait problem and joint swelling.   Skin: Negative for rash.   Neurological: Negative for speech difficulty.   Hematological: Does not bruise/bleed easily.   Psychiatric/Behavioral: Negative for confusion.       Past Medical History:   Diagnosis Date   • Anemia    • Arthritis         Past Surgical History:   Procedure Laterality Date   • RHINOPLASTY      septo plastisis       Family History   Problem Relation Age of Onset   • Cancer Mother    • Osteoarthritis Father    • Ovarian cancer Neg Hx    • Breast cancer Neg Hx        Social History     Socioeconomic History   • Marital status:      Spouse name: Not on file   • Number of children: Not on file   • Years of education: Not on file   • Highest education level: Not on file   Tobacco Use   • Smoking status: Former Smoker     Packs/day: 1.50     Years: 5.00     Pack years: 7.50     Types: Cigarettes     Start date:      Last attempt to quit:      Years since quittin.0   • Smokeless tobacco: Former User   Substance and Sexual Activity   • Alcohol use: Yes     Comment: occasional  "  • Drug use: No   • Sexual activity: Defer       I have reviewed the above medical and surgical history, family history, social history, medications, allergies and review of systems.    No Known Allergies      Current Outpatient Medications:   •  Acetaminophen-Codeine (TYLENOL WITH CODEINE #3 PO), Take  by mouth Every Night., Disp: , Rfl:   •  benzonatate (TESSALON) 100 MG capsule, Take 100 mg by mouth Daily., Disp: , Rfl:   •  meloxicam (MOBIC) 7.5 MG tablet, Take 1 tablet by mouth Daily., Disp: 14 tablet, Rfl: 0  •  NUVARING 0.12-0.015 MG/24HR vaginal ring, , Disp: , Rfl:     Objective   Resp 18   Ht 157.5 cm (62\")   Wt 86.2 kg (190 lb)   BMI 34.75 kg/m²    Physical Exam  Constitutional: Patient is oriented to person, place, and time. Patient appears well-developed and well-nourished.   HENT:Head: Normocephalic and atraumatic.   Eyes: EOM are normal. Pupils are equal, round, and reactive to light.   Neck: Normal range of motion. Neck supple.   Cardiovascular: Normal rate.    Pulmonary/Chest: Effort normal and breath sounds normal.   Abdominal: Soft.   Neurological: Patient is alert and oriented to person, place, and time.   Skin: Skin is warm and dry.   Psychiatric: Patient has a normal mood and affect.   Nursing note and vitals reviewed.       [unfilled]   Right wrist: Full painless wrist range of motion no palpable ganglion cyst no triggering of the fingers no thenar atrophy good circulation, slight decrease sensation index fingertip with prolonged carpal compression otherwise negative Tinel sign over the median nerve and negative Phalen sign.    Assessment/Plan   Review of Radiographic Studies:    No new imaging done today.      Procedures     Farida was seen today for follow-up.    Diagnoses and all orders for this visit:    Carpal tunnel syndrome of right wrist       Use brace as instructed and Work status form completed and provided to patient      Recommendations/Plan:   Work/Activity Status: May " perform usual activities as tolerated    Patient agreeable to call or return sooner for any concerns.         Discussed and reviewed with her indications for further testing and the nature of surgical relief with carpal tunnel surgery if EMG studies are positive in the future.    Impression:  Much improved carpal tunnel symptoms right hand with night splinting  Plan:  If symptoms continue she will call schedule EMG study right hand and return to see me after EMG study complete to discuss surgical treatment.

## 2020-09-30 ENCOUNTER — TRANSCRIBE ORDERS (OUTPATIENT)
Dept: ADMINISTRATIVE | Facility: HOSPITAL | Age: 45
End: 2020-09-30

## 2020-09-30 DIAGNOSIS — Z12.31 VISIT FOR SCREENING MAMMOGRAM: Primary | ICD-10-CM

## 2021-01-04 ENCOUNTER — APPOINTMENT (OUTPATIENT)
Dept: MAMMOGRAPHY | Facility: HOSPITAL | Age: 46
End: 2021-01-04